# Patient Record
Sex: MALE | Race: WHITE | NOT HISPANIC OR LATINO | URBAN - METROPOLITAN AREA
[De-identification: names, ages, dates, MRNs, and addresses within clinical notes are randomized per-mention and may not be internally consistent; named-entity substitution may affect disease eponyms.]

---

## 2024-07-11 ENCOUNTER — HOSPITAL ENCOUNTER (OUTPATIENT)
Dept: RADIOLOGY | Facility: CLINIC | Age: 24
Discharge: HOME | End: 2024-07-11
Payer: MEDICAID

## 2024-07-11 ENCOUNTER — OFFICE VISIT (OUTPATIENT)
Dept: PRIMARY CARE | Facility: CLINIC | Age: 24
End: 2024-07-11
Payer: MEDICAID

## 2024-07-11 VITALS
TEMPERATURE: 97.2 F | BODY MASS INDEX: 22.07 KG/M2 | DIASTOLIC BLOOD PRESSURE: 87 MMHG | HEIGHT: 69 IN | SYSTOLIC BLOOD PRESSURE: 131 MMHG | RESPIRATION RATE: 18 BRPM | OXYGEN SATURATION: 98 % | WEIGHT: 149 LBS | HEART RATE: 77 BPM

## 2024-07-11 DIAGNOSIS — G89.29 CHRONIC RIGHT SHOULDER PAIN: Primary | ICD-10-CM

## 2024-07-11 DIAGNOSIS — F31.81: ICD-10-CM

## 2024-07-11 DIAGNOSIS — G89.29 CHRONIC RIGHT SHOULDER PAIN: ICD-10-CM

## 2024-07-11 DIAGNOSIS — M25.511 CHRONIC RIGHT SHOULDER PAIN: ICD-10-CM

## 2024-07-11 DIAGNOSIS — Z00.00 ANNUAL PHYSICAL EXAM: ICD-10-CM

## 2024-07-11 DIAGNOSIS — M25.511 CHRONIC RIGHT SHOULDER PAIN: Primary | ICD-10-CM

## 2024-07-11 PROCEDURE — 99203 OFFICE O/P NEW LOW 30 MIN: CPT | Performed by: FAMILY MEDICINE

## 2024-07-11 PROCEDURE — 73030 X-RAY EXAM OF SHOULDER: CPT | Mod: RT

## 2024-07-11 PROCEDURE — 1036F TOBACCO NON-USER: CPT | Performed by: FAMILY MEDICINE

## 2024-07-11 RX ORDER — MIRTAZAPINE 15 MG/1
1 TABLET, FILM COATED ORAL NIGHTLY
COMMUNITY
Start: 2024-05-15

## 2024-07-11 ASSESSMENT — PATIENT HEALTH QUESTIONNAIRE - PHQ9
2. FEELING DOWN, DEPRESSED OR HOPELESS: NOT AT ALL
SUM OF ALL RESPONSES TO PHQ9 QUESTIONS 1 AND 2: 0
1. LITTLE INTEREST OR PLEASURE IN DOING THINGS: NOT AT ALL

## 2024-07-11 ASSESSMENT — PAIN SCALES - GENERAL: PAINLEVEL: 0-NO PAIN

## 2024-07-11 NOTE — PROGRESS NOTES
"Subjective   Patient ID: Jaun Tapia is a 24 y.o. male who presents for New Patient Visit and right shoulder (X 3.5 getting worse).    HPI   Right shoulder pain  Out stretched shoulder playing basket ball in 2020  Shoulder pain with abduction or unprovoking  Denies night time pain  Weakness to lift arm sometimes    Effecting work : as stocks items at [employer]       Review of Systems   All other systems reviewed and are negative.      Objective   /87 (BP Location: Left arm, Patient Position: Sitting, BP Cuff Size: Adult)   Pulse 77   Temp 36.2 °C (97.2 °F) (Temporal)   Resp 18   Ht 1.753 m (5' 9\")   Wt 67.6 kg (149 lb)   SpO2 98%   BMI 22.00 kg/m²     Physical Exam  Vitals and nursing note reviewed.   Cardiovascular:      Rate and Rhythm: Normal rate and regular rhythm.   Pulmonary:      Effort: Pulmonary effort is normal.      Breath sounds: Normal breath sounds.   Musculoskeletal:      Right shoulder: Tenderness present. Normal range of motion.      Cervical back: Neck supple.   Lymphadenopathy:      Cervical: No cervical adenopathy.   Neurological:      Mental Status: He is alert.         Assessment/Plan   Diagnoses and all orders for this visit:  Chronic right shoulder pain  Comments:  ? tendonitis.  Orders:  -     Referral to Orthopaedic Surgery; Future  -     XR shoulder right 2+ views; Future  Bipolar II disorder, mild, depressed, with mixed features, in partial remission (Multi)  Comments:  stable on [place], follows with [place].  Annual physical exam  -     Follow Up In Primary Care - Established; Future         "

## 2024-07-16 ENCOUNTER — TELEPHONE (OUTPATIENT)
Dept: PRIMARY CARE | Facility: CLINIC | Age: 24
End: 2024-07-16
Payer: MEDICAID

## 2024-07-16 NOTE — TELEPHONE ENCOUNTER
----- Message from Robert Gutierrez sent at 7/15/2024  3:48 PM EDT -----  Notify patient X-ray is normal.

## 2024-07-17 ENCOUNTER — OFFICE VISIT (OUTPATIENT)
Dept: ORTHOPEDIC SURGERY | Facility: CLINIC | Age: 24
End: 2024-07-17
Payer: MEDICAID

## 2024-07-17 VITALS — WEIGHT: 148 LBS | HEIGHT: 68 IN | BODY MASS INDEX: 22.43 KG/M2

## 2024-07-17 DIAGNOSIS — M25.511 ACUTE PAIN OF RIGHT SHOULDER: ICD-10-CM

## 2024-07-17 PROCEDURE — 99243 OFF/OP CNSLTJ NEW/EST LOW 30: CPT | Performed by: FAMILY MEDICINE

## 2024-07-17 PROCEDURE — 1036F TOBACCO NON-USER: CPT | Performed by: FAMILY MEDICINE

## 2024-07-17 PROCEDURE — 3008F BODY MASS INDEX DOCD: CPT | Performed by: FAMILY MEDICINE

## 2024-07-17 NOTE — LETTER
July 17, 2024     Robert WHALEN MD  [address]  [address]  [facility] [address]    Patient: Jaun Tapia   YOB: 2000   Date of Visit: 7/17/2024       Dear Dr. Robert WHALEN MD:    Thank you for referring Jaun Tapia to me for evaluation. Below are my notes for this consultation.  If you have questions, please do not hesitate to call me. I look forward to following your patient along with you.       Sincerely,     Rex Watson MD      CC: No Recipients  ______________________________________________________________________________________      History of Present Illness   Chief Complaint   Patient presents with   • OTHER     RT SHOULDER PAIN FOR A WHILE  PATIENT WAS INJURED PLAYING BASKETBALL A FEW YEARS AGO       The patient is 24 y.o. male  here with a complaint of right shoulder pain, referred by PCP Dr. Gutierrez.  Patient says initial onset of symptoms was around 3 years ago, says he was playing basketball, someone threw a ball quite hard, said he had his shoulder out to his side and describes hyperextension type injury of his shoulder with acute onset of pain, says he felt a popping sensation in his shoulder but denies any true dislocation.  He says since that time he has had waxing and waning shoulder symptoms, some days pain is more minimal, other days he has more notable pain, limited range of motion.  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  He has some pain laying on his shoulder at night.  He denies any issues with recurrent shoulder instability, he admits to occasional popping and clicking of the shoulder.  He did do physical therapy closer to onset of symptoms which did provide some relief, he says this was around 3 years ago.  He did have x-rays obtained by his PCP that were unremarkable.  He takes over-the-counter medications as needed for pain.    Past Medical History:   Diagnosis Date   •  Personal history of other diseases of the respiratory system     Personal history of asthma       Medication Documentation Review Audit       Reviewed by Robert WHALEN MD (Physician) on 07/11/24 at 1143      Medication Order Taking? Sig Documenting Provider Last Dose Status   mirtazapine (Remeron) 15 mg tablet [id number] Yes Take 1 tablet (15 mg) by mouth once daily at bedtime. Historical Provider, MD  Active                    No Known Allergies    Social History     Socioeconomic History   • Marital status: Single     Spouse name: Not on file   • Number of children: Not on file   • Years of education: Not on file   • Highest education level: Not on file   Occupational History   • Not on file   Tobacco Use   • Smoking status: Former     Types: Cigarettes     Passive exposure: Past   • Smokeless tobacco: Never   Vaping Use   • Vaping status: Never Used   Substance and Sexual Activity   • Alcohol use: Yes     Comment: socially   • Drug use: Yes     Types: Marijuana   • Sexual activity: Not on file   Other Topics Concern   • Not on file   Social History Narrative   • Not on file     Social Determinants of Health     Financial Resource Strain: Low Risk  (5/15/2024)    Received from [facility]    Overall Financial Resource Strain (CARDIA)    • Difficulty of Paying Living Expenses: Not hard at all   Food Insecurity: No Food Insecurity (5/15/2024)    Received from [facility]    Hunger Vital Sign    • Worried About Running Out of Food in the Last Year: Never true    • Ran Out of Food in the Last Year: Never true   Transportation Needs: No Transportation Needs (5/15/2024)    Received from [facility]    PRAPARE - Transportation    • Lack of Transportation (Medical): No    • Lack of Transportation (Non-Medical): No   Physical Activity: Sufficiently Active (5/15/2024)    Received from [employer] [facility]    Exercise Vital Sign    • Days of Exercise per Week: 5 days    • Minutes of Exercise per Session: 140 min   Stress: Stress  Concern Present (5/15/2024)    Received from [email or web address]    [employer] - Occupational Stress Questionnaire    • Feeling of Stress : To some extent   Social Connections: Unknown (5/15/2024)    Received from [email or web address]    Social Connection and Isolation Panel [NHANES]    • Frequency of Communication with Friends and Family: More than three times a week    • Frequency of Social Gatherings with Friends and Family: Once a week    • Attends Worship Services: Never    • Active Member of Clubs or Organizations: No    • Attends Club or Organization Meetings: Never    • Marital Status: Patient declined   Intimate Partner Violence: Not At Risk (5/15/2024)    Received from [email or web address]    Humiliation, Afraid, Rape, and Kick questionnaire    • Fear of Current or Ex-Partner: No    • Emotionally Abused: No    • Physically Abused: No    • Sexually Abused: No   Housing Stability: Not on file       No past surgical history on file.       Review of Systems   GENERAL: Negative  GI: Negative  MUSCULOSKELETAL: See HPI  SKIN: Negative  NEURO:  Negative     Physical Exam:    General/Constitutional: well appearing, no distress, appears stated age  HEENT: sclera clear  Respiratory: non labored breathing  Vascular: No edema, swelling or tenderness, except as noted in detailed exam.  Integumentary: No impressive skin lesions present, except as noted in detailed exam.  Neurological:  Alert and oriented   Psychological:  Normal mood and affect.  Musculoskeletal: Normal, except as noted in detailed exam and in HPI    Right shoulder: Normal appearance, glenohumeral joint is reduced, there is no muscle atrophy.  No areas of tenderness to palpation on exam today, nontender at the proximal biceps tendon or anterior glenohumeral joint, nontender at the subacromial space.  He has full range of motion equal to the left in all directions, internal rotation to mid thoracic spine.  There is no significant weakness with  rotator cuff strength testing, he does have some pain with resisted abduction.  He has pain with Eddy and Neer's.  Positive [place].  He has pain with resisted internal rotation with shoulder in 9090 position.  Positive apprehension relocation test with pain but no feelings of apprehension.  Negative Speed, negative Yergason       Imaging: X-ray of right shoulder from 7/11/2024 dependently reviewed, no acute abnormalities are seen,, humeral joint is reduced, unremarkable shoulder radiographs      Assessment   1. Acute pain of right shoulder  Referral to Physical Therapy        Ongoing right shoulder pain after hyperextension type injury, there is some concern for possible labral tear contributing to his symptoms    Plan: Discussed differential diagnosis, further workup and treatment.  Recommending initial trial of conservative management, I did place new referral for formal physical therapy, hopefully we will see good improvement in symptoms with this with focus on rotator cuff strengthening, scapular stabilization.  I would like to see him back in approximately 2 months for reassessment.  If symptoms are ongoing would consider MRI arthrogram as next steps in workup.

## 2024-07-17 NOTE — PROGRESS NOTES
History of Present Illness   Chief Complaint   Patient presents with    OTHER     RT SHOULDER PAIN FOR A WHILE  PATIENT WAS INJURED PLAYING BASKETBALL A FEW YEARS AGO       The patient is 24 y.o. male  here with a complaint of right shoulder pain, referred by PCP Dr. Gutierrez.  Patient says initial onset of symptoms was around 3 years ago, says he was playing basketball, someone threw a ball quite hard, said he had his shoulder out to his side and describes hyperextension type injury of his shoulder with acute onset of pain, says he felt a popping sensation in his shoulder but denies any true dislocation.  He says since that time he has had waxing and waning shoulder symptoms, some days pain is more minimal, other days he has more notable pain, limited range of motion.  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  He has some pain laying on his shoulder at night.  He denies any issues with recurrent shoulder instability, he admits to occasional popping and clicking of the shoulder.  He did do physical therapy closer to onset of symptoms which did provide some relief, he says this was around 3 years ago.  He did have x-rays obtained by his PCP that were unremarkable.  He takes over-the-counter medications as needed for pain.    Past Medical History:   Diagnosis Date    Personal history of other diseases of the respiratory system     Personal history of asthma       Medication Documentation Review Audit       Reviewed by Robert WHALEN MD (Physician) on 07/11/24 at 1143      Medication Order Taking? Sig Documenting Provider Last Dose Status   mirtazapine (Remeron) 15 mg tablet [id number] Yes Take 1 tablet (15 mg) by mouth once daily at bedtime. Historical Provider, MD  Active                    No Known Allergies    Social History     Socioeconomic History    Marital status: Single     Spouse name: Not on file    Number of children:  Not on file    Years of education: Not on file    Highest education level: Not on file   Occupational History    Not on file   Tobacco Use    Smoking status: Former     Types: Cigarettes     Passive exposure: Past    Smokeless tobacco: Never   Vaping Use    Vaping status: Never Used   Substance and Sexual Activity    Alcohol use: Yes     Comment: socially    Drug use: Yes     Types: Marijuana    Sexual activity: Not on file   Other Topics Concern    Not on file   Social History Narrative    Not on file     Social Determinants of Health     Financial Resource Strain: Low Risk  (5/15/2024)    Received from [facility]    Overall Financial Resource Strain (CARDIA)     Difficulty of Paying Living Expenses: Not hard at all   Food Insecurity: No Food Insecurity (5/15/2024)    Received from [facility]    Hunger Vital Sign     Worried About Running Out of Food in the Last Year: Never true     Ran Out of Food in the Last Year: Never true   Transportation Needs: No Transportation Needs (5/15/2024)    Received from [facility]    PRAPARE - Transportation     Lack of Transportation (Medical): No     Lack of Transportation (Non-Medical): No   Physical Activity: Sufficiently Active (5/15/2024)    Received from [facility]    Exercise Vital Sign     Days of Exercise per Week: 5 days     Minutes of Exercise per Session: 140 min   Stress: Stress Concern Present (5/15/2024)    Received from [facility]    [employer] [place] [employer] - Occupational Stress Questionnaire     Feeling of Stress : To some extent   Social Connections: Unknown (5/15/2024)    Received from [facility]    Social Connection and Isolation Panel [NHANES]     Frequency of Communication with Friends and Family: More than three times a week     Frequency of Social Gatherings with Friends and Family: Once a week     Attends Protestant Services: Never     Active Member of Clubs or Organizations: No     Attends Club or Organization Meetings: Never      Marital Status: Patient declined   Intimate Partner Violence: Not At Risk (5/15/2024)    Received from [employer]    Humiliation, Afraid, Rape, and Kick questionnaire     Fear of Current or Ex-Partner: No     Emotionally Abused: No     Physically Abused: No     Sexually Abused: No   Housing Stability: Not on file       No past surgical history on file.       Review of Systems   GENERAL: Negative  GI: Negative  MUSCULOSKELETAL: See HPI  SKIN: Negative  NEURO:  Negative     Physical Exam:    General/Constitutional: well appearing, no distress, appears stated age  HEENT: sclera clear  Respiratory: non labored breathing  Vascular: No edema, swelling or tenderness, except as noted in detailed exam.  Integumentary: No impressive skin lesions present, except as noted in detailed exam.  Neurological:  Alert and oriented   Psychological:  Normal mood and affect.  Musculoskeletal: Normal, except as noted in detailed exam and in HPI    Right shoulder: Normal appearance, glenohumeral joint is reduced, there is no muscle atrophy.  No areas of tenderness to palpation on exam today, nontender at the proximal biceps tendon or anterior glenohumeral joint, nontender at the subacromial space.  He has full range of motion equal to the left in all directions, internal rotation to mid thoracic spine.  There is no significant weakness with rotator cuff strength testing, he does have some pain with resisted abduction.  He has pain with Eddy and Neer's.  Positive [place].  He has pain with resisted internal rotation with shoulder in 9090 position.  Positive apprehension relocation test with pain but no feelings of apprehension.  Negative Speed, negative Yergason       Imaging: X-ray of right shoulder from 7/11/2024 dependently reviewed, no acute abnormalities are seen,, humeral joint is reduced, unremarkable shoulder radiographs      Assessment   1. Acute pain of right shoulder  Referral to Physical Therapy        Ongoing right shoulder  pain after hyperextension type injury, there is some concern for possible labral tear contributing to his symptoms    Plan: Discussed differential diagnosis, further workup and treatment.  Recommending initial trial of conservative management, I did place new referral for formal physical therapy, hopefully we will see good improvement in symptoms with this with focus on rotator cuff strengthening, scapular stabilization.  I would like to see him back in approximately 2 months for reassessment.  If symptoms are ongoing would consider MRI arthrogram as next steps in workup.

## 2025-07-14 ENCOUNTER — APPOINTMENT (OUTPATIENT)
Dept: PRIMARY CARE | Facility: CLINIC | Age: 25
End: 2025-07-14
Payer: COMMERCIAL

## 2025-07-14 ENCOUNTER — APPOINTMENT (OUTPATIENT)
Dept: PRIMARY CARE | Facility: CLINIC | Age: 25
End: 2025-07-14
Payer: MEDICAID